# Patient Record
Sex: MALE
[De-identification: names, ages, dates, MRNs, and addresses within clinical notes are randomized per-mention and may not be internally consistent; named-entity substitution may affect disease eponyms.]

---

## 2019-09-12 ENCOUNTER — HOSPITAL ENCOUNTER (EMERGENCY)
Dept: HOSPITAL 63 - ER | Age: 59
Discharge: HOME | End: 2019-09-12
Payer: SELF-PAY

## 2019-09-12 VITALS — SYSTOLIC BLOOD PRESSURE: 147 MMHG | DIASTOLIC BLOOD PRESSURE: 85 MMHG

## 2019-09-12 DIAGNOSIS — G89.29: ICD-10-CM

## 2019-09-12 DIAGNOSIS — I10: ICD-10-CM

## 2019-09-12 DIAGNOSIS — R51: Primary | ICD-10-CM

## 2019-09-12 DIAGNOSIS — G47.33: ICD-10-CM

## 2019-09-12 PROCEDURE — 70450 CT HEAD/BRAIN W/O DYE: CPT

## 2019-09-12 NOTE — RAD
Exam: CT head

 

INDICATION: Headache

 

TECHNIQUE: Sequential axial images through the head were obtained without 

the administration of IV contrast.

 

Comparisons: None

 

FINDINGS:

No focal parenchymal lesion or hemorrhage is identified. There is no 

midline shift or sulcal effacement.

 

No acute vascular territory infarction is identified. Gray-white 

distinction is preserved.

 

The ventricular system is within normal limits without compression 

hydrocephalus. The basal cisterns are well maintained.

 

The visualized portions of the paranasal sinuses and mastoid air cells are

well-pneumatized. No acute fractures.

 

IMPRESSION:

No acute intracranial abnormality.

 

Exposure: One or more of the following in the visualized dose reduction 

techniques were utilized for this examination:

1.  Automated exposure control

2.  Adjustment of the MA and/or KV according to patient size

Use of iterative of reconstructive technique

 

Electronically signed by: Jimbo May MD (9/12/2019 10:07 PM) [id number]

## 2019-09-12 NOTE — PHYS DOC
Past History


Past Medical History:  Hypertension, Other


Additional Past Medical Histor:  cellulitis, ADRIENNE


Past Surgical History:  Appendectomy, Other


Additional Past Surgical Histo:  left facial reconstruction, hardware still in 

place


Alcohol Use:  Occasionally


Drug Use:  Cocaine, Marijuana


Social History Narrative:  " crack cocaine" last used 48 hours ago





Adult General


Chief Complaint


Chief Complaint:  HEADACHE





HPI


HPI


Patient is a 59-year-old -American male, who presents to the emergency 

department via EMS, [facility]. He states he has had 

chronic, recurrent headaches on the left side of his head,, Secondary to left 

facial and head trauma about 20 years ago. He states that he normally can take 

Tylenol, but did not have any Tylenol and hand today, and his headache gradually

got bed, and it is slightly worsened his chronic recurrent headache, although he

feels it is a variation of the same type of headache. He denies any numbness, 

weakness, or new vision changes. He has not had any vomiting, neck pain, fevers,

chills, chest pain or shortness of breath. There are no alleviating, or 

exacerbating factors to his symptoms.





Review of Systems


Review of Systems





Constitutional: Denies fever or chills []


Eyes: Denies change in visual acuity, redness, or eye pain []


HENT: Denies nasal congestion or sore throat []


Respiratory: Denies cough or shortness of breath []


Cardiovascular: No additional information not addressed in HPI []


GI: Denies abdominal pain, nausea, vomiting, bloody stools or diarrhea []


: Denies dysuria or hematuria []


Musculoskeletal: Denies back pain or joint pain []


Integument: Denies rash or skin lesions []


Neurologic: Denies mental status changes, focal weakness or sensory changes []


Endocrine: Denies polyuria or polydipsia []





All other systems were reviewed and found to be within normal limits, except as 

documented in this note.





Allergies


Allergies





Allergies








Coded Allergies Type Severity Reaction Last Updated Verified


 


  No Known Drug Allergies    9/12/19 No











Physical Exam


Physical Exam


PHYSICAL EXAM:





CONSTITUTIONAL: Well developed, well nourished


HEAD: normocephalic, atraumatic. There is no reproducible tenderness to 

palpation.


EENT: PERRL, EOMI. Conjunctivae normal color, there is no photophobia on exam, 

sclerae non-icteric; moist mucous membranes.


NECK: Supple, non-tender; no meningismus.


LUNGS: Lungs CTA, breathing even and unlabored. Normal air movement.


HEART: Regular rate and rhythm, no murmur


CHEST: No deformity; non-tender


ABDOMEN: The abdomen is soft, and non-tender, no masses or bruits.


EXTREM: Normal ROM; no deformity, no calf tenderness. Normal pulses palpable in 

all extremities. There is no pedal edema.


SKIN: No rash; no diaphoresis


NEURO: Alert; normal speech and cognition; CN's grossly intact; strength grossly

 intact without focal deficit.


BACK: No CVA TTP.





Current Patient Data


Vital Signs





                                   Vital Signs








  Date Time  Temp Pulse Resp B/P (MAP) Pulse Ox O2 Delivery O2 Flow Rate FiO2


 


9/12/19 21:10 98.4 98 16  98 Room Air  











EKG


EKG


[]





Radiology/Procedures


Radiology/Procedures


[PROCEDURE: CT HEAD WO CONTRAST





Exam: CT head


 


INDICATION: Headache


 


TECHNIQUE: Sequential axial images through the head were obtained without 


the administration of IV contrast.


 


Comparisons: None


 


FINDINGS:


No focal parenchymal lesion or hemorrhage is identified. There is no 


midline shift or sulcal effacement.


 


No acute vascular territory infarction is identified. Gray-white 


distinction is preserved.


 


The ventricular system is within normal limits without compression 


hydrocephalus. The basal cisterns are well maintained.


 


The visualized portions of the paranasal sinuses and mastoid air cells are


well-pneumatized. No acute fractures.


 


IMPRESSION:


No acute intracranial abnormality.


 ]





Course & Med Decision Making


Course & Med Decision Making


Pertinent  Imaging studies reviewed. (See chart for details)





[]10:25 PM:Patient remains stable. His headache has resolved. He is feeling much

 better, back to baseline, ready to go home. I discussed test results, the need 

for close follow-up, and return precautions.





Dragon Disclaimer


Dragon Disclaimer


This electronic medical record was generated, in whole or in part, using a voice

 recognition dictation system.





Departure


Departure:


Impression:  


   Primary Impression:  


   Chronic headache


Disposition:  01 HOME, SELF-CARE


Condition:  STABLE


Patient Instructions:  General Headache Without Cause





Additional Instructions:  


Tylenol as needed for pain. Follow-up with your primary care provider for 

further evaluation.











GABY SMITH MD           Sep 12, 2019 21:32